# Patient Record
Sex: MALE | URBAN - METROPOLITAN AREA
[De-identification: names, ages, dates, MRNs, and addresses within clinical notes are randomized per-mention and may not be internally consistent; named-entity substitution may affect disease eponyms.]

---

## 2017-02-20 ENCOUNTER — HOSPITAL ENCOUNTER (EMERGENCY)
Age: 27
Discharge: LEFT BEFORE DISCHARGE | End: 2017-02-20
Attending: EMERGENCY MEDICINE

## 2017-02-20 VITALS
WEIGHT: 230 LBS | HEART RATE: 107 BPM | TEMPERATURE: 98.5 F | DIASTOLIC BLOOD PRESSURE: 94 MMHG | OXYGEN SATURATION: 98 % | RESPIRATION RATE: 16 BRPM | SYSTOLIC BLOOD PRESSURE: 141 MMHG

## 2017-02-20 PROCEDURE — 75810000275 HC EMERGENCY DEPT VISIT NO LEVEL OF CARE

## 2017-02-20 RX ORDER — ONDANSETRON 8 MG/1
8 TABLET, ORALLY DISINTEGRATING ORAL
Status: DISCONTINUED | OUTPATIENT
Start: 2017-02-20 | End: 2017-02-20 | Stop reason: HOSPADM

## 2017-02-21 NOTE — ED NOTES
Pt told registration that he wants to leave and go to a [facility] because he thought that is where he was going in the first place. Pt had already left when I went to lobby to attempt to speak with pt. Pt eloped.

## 2017-02-21 NOTE — ED TRIAGE NOTES
Pt reports abdominal pain x 3 days with vomiting starting today around 1500. 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

## 2017-02-21 NOTE — ED NOTES
I performed a brief evaluation, including history and physical, of the patient here in triage and I have determined that pt will need further treatment and evaluation from the main side ER physician. I have placed initial orders to help in expediting patients care.      February 20, 2017 at 8:56 PM - Eulalia Nguyen MD        Visit Vitals    BP (!) 141/94 (BP 1 Location: Right arm, BP Patient Position: Sitting)    Pulse (!) 107    Temp 98.5 °F (36.9 °C)    Resp 16    Wt 104.3 kg (230 lb)    SpO2 98%        Patient with n/v and stated he noted blood  Pain is 6/10  LL MD